# Patient Record
Sex: FEMALE | Race: WHITE
[De-identification: names, ages, dates, MRNs, and addresses within clinical notes are randomized per-mention and may not be internally consistent; named-entity substitution may affect disease eponyms.]

---

## 2017-03-03 ENCOUNTER — HOSPITAL ENCOUNTER (OUTPATIENT)
Age: 82
End: 2017-03-03
Payer: MEDICARE

## 2017-03-03 DIAGNOSIS — I34.0: Primary | ICD-10-CM

## 2023-06-15 ENCOUNTER — TRANSCRIBE ORDERS (OUTPATIENT)
Dept: ADMINISTRATIVE | Facility: HOSPITAL | Age: 88
End: 2023-06-15
Payer: MEDICARE

## 2023-06-15 DIAGNOSIS — R55 SYNCOPE AND COLLAPSE: Primary | ICD-10-CM

## 2023-07-27 ENCOUNTER — OFFICE VISIT (OUTPATIENT)
Dept: CARDIOLOGY | Facility: CLINIC | Age: 88
End: 2023-07-27
Payer: MEDICARE

## 2023-07-27 VITALS
WEIGHT: 130 LBS | SYSTOLIC BLOOD PRESSURE: 172 MMHG | HEART RATE: 57 BPM | BODY MASS INDEX: 21.66 KG/M2 | HEIGHT: 65 IN | DIASTOLIC BLOOD PRESSURE: 70 MMHG

## 2023-07-27 DIAGNOSIS — R55 SYNCOPE AND COLLAPSE: Primary | ICD-10-CM

## 2023-07-27 DIAGNOSIS — R01.1 HEART MURMUR: ICD-10-CM

## 2023-07-27 DIAGNOSIS — R00.1 BRADYCARDIA, SINUS: ICD-10-CM

## 2023-07-27 DIAGNOSIS — R09.89 BRUIT OF RIGHT CAROTID ARTERY: ICD-10-CM

## 2023-07-27 DIAGNOSIS — I25.6 SILENT MYOCARDIAL ISCHEMIA: ICD-10-CM

## 2023-07-27 DIAGNOSIS — I10 PRIMARY HYPERTENSION: ICD-10-CM

## 2023-07-27 PROCEDURE — 99204 OFFICE O/P NEW MOD 45 MIN: CPT | Performed by: INTERNAL MEDICINE

## 2023-07-27 PROCEDURE — 93000 ELECTROCARDIOGRAM COMPLETE: CPT | Performed by: INTERNAL MEDICINE

## 2023-07-27 PROCEDURE — 1159F MED LIST DOCD IN RCRD: CPT | Performed by: INTERNAL MEDICINE

## 2023-07-27 PROCEDURE — 1160F RVW MEDS BY RX/DR IN RCRD: CPT | Performed by: INTERNAL MEDICINE

## 2023-07-27 RX ORDER — CELECOXIB 200 MG/1
200 CAPSULE ORAL DAILY
COMMUNITY

## 2023-07-27 RX ORDER — ASPIRIN 81 MG/1
81 TABLET ORAL DAILY
Qty: 30 TABLET | Refills: 6 | Status: SHIPPED | OUTPATIENT
Start: 2023-07-27

## 2023-07-27 RX ORDER — VALSARTAN 320 MG/1
320 TABLET ORAL DAILY
COMMUNITY

## 2023-07-27 RX ORDER — METOPROLOL SUCCINATE 25 MG/1
25 TABLET, EXTENDED RELEASE ORAL DAILY
COMMUNITY
End: 2023-07-27

## 2023-07-27 RX ORDER — MINOXIDIL 10 MG/1
10 TABLET ORAL DAILY
Qty: 30 TABLET | Refills: 6 | Status: SHIPPED | OUTPATIENT
Start: 2023-07-27

## 2023-07-27 RX ORDER — FERROUS SULFATE 325(65) MG
325 TABLET ORAL
COMMUNITY

## 2023-07-27 RX ORDER — AMLODIPINE BESYLATE AND ATORVASTATIN CALCIUM 5; 20 MG/1; MG/1
1 TABLET, FILM COATED ORAL DAILY
COMMUNITY

## 2023-07-27 NOTE — PROGRESS NOTES
Chief Complaint   Patient presents with   • Establish Care     PCP referred, syncopal episode.    • Loss of Consciousness     2 episodes within the past 2 years, once at [facility] last year,stayed overnight at [facility], last episode at [facility] about 2 months ago, ER checked and discharged.    • Dizziness     Reports frequent episodes of dizziness. She does drink a lot of water.    • Cardiac history     Daughter said she wore monitor for [employer] a couple years ago after her first syncopal episode, LM requesting results. Getting hospital records to check for previous testing. PCP had scheduled pt for an echo but they had not been able to get ahold of the daughter.    • Labs     PCP checked a couple months ago, have requested results.    • Hypertension     Has not taken her medication this morning. Pt is taking it now.         CARDIAC COMPLAINTS  Dizziness and syncope      Subjective   Rita Camacho is a 88 y.o. female came in today with her daughter for her initial cardiac evaluation.  She has history of hypertension, history of memory problem who had 2 significant episodes of syncope in the last few years.  The first episode happened when she was at [facility].  According to the daughter, the patient was following in [facility] when she noticed that she is now at keeping up.  When she turned to look at she was slumped over the shopping cart and about to fall.  She was able to hold her and able to prevent falling onto the ground.  She apparently was unconscious for few seconds.  She was brought to the emergency room.  She was kept overnight had a echocardiogram done which was read by another cardiologist as having normal LV systolic function, mild left atrial enlargement and mild to moderate pulm hypertension.  The second episode happened with 2 months ago at the [facility].  It happened in the evening where she was talking to one of the members of the [facility] and the daughter went to the car to  something.  Someone called her  and asked her to call 911.  By the time she came back to [facility] patient was on the ground and was told that she passed out for few minutes.  She was brought to the emergency room but was not seen since she was just sitting there for few hours.  She went home.  Apparently she did injure her wrist and was seen by one of the orthopedic surgeon later on and was told nothing was broken.  She is now referred for further evaluation.  She continued to have episodes of dizziness.  She denies having any palpitation.  She denies having any chest pain.  She had labs done at your office recently but I do not have any of the results.  She has no history of smoking or drinking alcohol or using illicit drugs.  Her father  with lung disease and her mother  with a stroke    Past Surgical History:   Procedure Laterality Date   • ECHO - CONVERTED  2022    @ [facility]. - EF 55%. LA- 4.0. RVSP- 44 mmHg   • ECHO - CONVERTED  2018    @ [facility]. Dr. Kerr- EF 50%. RVSP- 49 mmHg.   • US CAROTID UNILATERAL  2021    < 50% Both ICA       Current Outpatient Medications   Medication Sig Dispense Refill   • amLODIPine-atorvastatin (CADUET) 5-20 MG per tablet Take 1 tablet by mouth Daily.     • Calcium Carbonate (CALCIUM 500 PO) Take  by mouth Daily.     • celecoxib (CeleBREX) 200 MG capsule Take 1 capsule by mouth Daily.     • ferrous sulfate 325 (65 FE) MG tablet Take 1 tablet by mouth Daily With Breakfast.     • PHOSPHATIDYLSERINE PO Take  by mouth 2 (Two) Times a Day.     • TURMERIC PO Take  by mouth Daily.     • valsartan (DIOVAN) 320 MG tablet Take 1 tablet by mouth Daily.     • vitamin D3 (Vitamin D) 125 MCG (5000 UT) capsule capsule Take 1 capsule by mouth Daily.     • aspirin 81 MG EC tablet Take 1 tablet by mouth Daily. 30 tablet 6   • minoxidil (LONITEN) 10 MG tablet Take 1 tablet by mouth Daily. 30 tablet 6     No current facility-administered medications for this visit.           ALLERGIES:  Patient has  "no known allergies.    Past Medical History:   Diagnosis Date   • Anemia    • Arthritis    • Dementia    • History of hysterectomy    • Hyperlipidemia    • Hypertension    • Stroke     daughter thinks pt may have had a stroke years ago       Social History     Tobacco Use   Smoking Status Never   Smokeless Tobacco Never          Family History   Problem Relation Age of Onset   • Stroke Mother    • Lung disease Father    • Other Maternal Grandmother         medical history unknown   • Other Maternal Grandfather         medical history unknown   • Other Paternal Grandmother         medical history unknown   • Other Paternal Grandfather         medical history unknown   • Other Other         6 siblings, medical history unknown   • Other Other         7 children, no known heart problems       Review of Systems   Constitutional: Negative for decreased appetite and malaise/fatigue.   HENT:  Negative for congestion and sore throat.    Eyes:  Negative for blurred vision, double vision and visual disturbance.   Cardiovascular:  Positive for near-syncope and syncope. Negative for chest pain and dyspnea on exertion.   Respiratory:  Negative for shortness of breath and snoring.    Endocrine: Negative for cold intolerance and heat intolerance.   Hematologic/Lymphatic: Negative for adenopathy. Does not bruise/bleed easily.   Skin:  Negative for itching, nail changes and skin cancer.   Musculoskeletal:  Negative for arthritis and myalgias.   Gastrointestinal:  Negative for abdominal pain, dysphagia and heartburn.   Genitourinary:  Negative for bladder incontinence and frequency.   Neurological:  Positive for dizziness. Negative for seizures and vertigo.   Psychiatric/Behavioral:  Negative for altered mental status.    Allergic/Immunologic: Negative for environmental allergies and hives.     Diabetes- No  Thyroid- normal    Objective     /70 (BP Location: Right arm)   Pulse 57   Ht 165.1 cm (65\")   Wt 59 kg (130 lb)   BMI " 21.63 kg/m²     Vitals and nursing note reviewed.   Constitutional:       Appearance: Healthy appearance. Not in distress.   Eyes:      Conjunctiva/sclera: Conjunctivae normal.      Pupils: Pupils are equal, round, and reactive to light.   HENT:      Head: Normocephalic.   Pulmonary:      Effort: Pulmonary effort is normal.      Breath sounds: Normal breath sounds.   Cardiovascular:      PMI at left midclavicular line. Bradycardia present. Regular rhythm. loud S2.       Murmurs: There is a grade 3/6 high frequency blowing holosystolic murmur at the apex.   Abdominal:      General: Bowel sounds are normal.      Palpations: Abdomen is soft.   Musculoskeletal: Normal range of motion.      Cervical back: Normal range of motion and neck supple. Skin:     General: Skin is warm and dry.   Neurological:      Mental Status: Alert, oriented to person, place, and time and oriented to person, place and time.       ECG 12 Lead    Date/Time: 7/27/2023 1:00 PM  Performed by: Nate Rashid MD  Authorized by: Nate Rashid MD   Comparison: compared with previous ECG from 12/18/2008  Comparison to previous ECG: Bradu=ycardia  Rhythm: sinus bradycardia  Rate: bradycardic  Conduction: incomplete left bundle branch block  QRS axis: normal  Other findings: non-specific ST-T wave changes    Clinical impression: abnormal EKG        @ASSESSMENT/PLAN@  BMI is within normal parameters. No other follow-up for BMI required.     Diagnoses and all orders for this visit:    1. Syncope and collapse (Primary)  -     Stress Test With Myocardial Perfusion One Day; Future  -     Adult Transthoracic Echo Complete W/ Cont if Necessary Per Protocol; Future  -     Mobile Cardiac Outpatient Telemetry; Future  -     US Carotid Bilateral; Future    2. Primary hypertension  -     minoxidil (LONITEN) 10 MG tablet; Take 1 tablet by mouth Daily.  Dispense: 30 tablet; Refill: 6    3. Heart murmur  -     Adult Transthoracic Echo Complete W/ Cont if  Necessary Per Protocol; Future    4. Bradycardia, sinus  -     Mobile Cardiac Outpatient Telemetry; Future    5. Silent myocardial ischemia  -     Stress Test With Myocardial Perfusion One Day; Future  -     aspirin 81 MG EC tablet; Take 1 tablet by mouth Daily.  Dispense: 30 tablet; Refill: 6    6. Bruit of right carotid artery  -     US Carotid Bilateral; Future  -     aspirin 81 MG EC tablet; Take 1 tablet by mouth Daily.  Dispense: 30 tablet; Refill: 6    At baseline she is mildly bradycardic.  Her blood pressure is moderately elevated.  Her EKG shows sinus bradycardia, incomplete left bundle branch block, nonspecific ST-T changes.  Her clinical examination reveals a BMI of 22.  She does have a carotid bruit on the right side.  She has slightly loud second heart sound.    Regarding the syncopal episode, she had 2 episodes both with sudden onset lasted only for few seconds to few minutes.  Nothing obvious was noted at that time.  I explained to her and the daughter that with bradycardia, she needs to rule out any significant pauses causing it.  She is not on any beta-blockers at this time.  I advised her to wear a monitor for the next 1 month.  I also explained to her about the possibility of silent myocardial ischemia.  I scheduled her to undergo a stress test in the form of Lexiscan to rule it out.    Regarding her hypertension, it is still moderately elevated.  She is already on a combination of calcium channel blockers, ARB and still the blood pressure is moderately elevated.  I added minoxidil 10 mg once a day.  Since I do not have her potassium level or renal function, I am afraid to add any Aldactone.  I advised her to have the blood pressure checked every day and call us if there is any significant drop in the blood pressure.    Regarding her heart murmur, need to rule out any valvular heart disease and also need to evaluate the PA pressure.  I scheduled her to undergo an echocardiogram for LVH, LV  function, valvular structures as well as to rule out pericardial effusion    Regarding the bradycardia, if there is any significant pauses during the monitor or if she develops AV block then she may be a candidate for a pacemaker placement    Regarding the carotid bruit, advised her to undergo carotid ultrasound.  Meanwhile I advised her to be on aspirin at 81 mg once a day    Regarding advanced directive, talked to her and the daughter about living will and power of .    Patient with results, further recommendations will be made.               Electronically signed by Nate Rashid MD July 27, 2023 12:44 EDT

## 2023-07-27 NOTE — LETTER
July 27, 2023       No Recipients    Patient: Rita Camacho   YOB: 1935   Date of Visit: 7/27/2023     Dear Nestor Hankins MD:       Thank you for referring Rita Camacho to me for evaluation. Below are the relevant portions of my assessment and plan of care.    If you have questions, please do not hesitate to call me. I look forward to following Rita along with you.         Sincerely,        Nate Rashid MD        CC:   No Recipients    Nate Rashid MD  07/27/23 1300  Sign when Signing Visit  Chief Complaint   Patient presents with   • Establish Care     PCP referred, syncopal episode.    • Loss of Consciousness     2 episodes within the past 2 years, once at [facility] last year,stayed overnight at [facility], last episode at [facility] about 2 months ago, ER checked and discharged.    • Dizziness     Reports frequent episodes of dizziness. She does drink a lot of water.    • Cardiac history     Daughter said she wore monitor for [employer] a couple years ago after her first syncopal episode, LM requesting results. Getting hospital records to check for previous testing. PCP had scheduled pt for an echo but they had not been able to get ahold of the daughter.    • Labs     PCP checked a couple months ago, have requested results.    • Hypertension     Has not taken her medication this morning. Pt is taking it now.         CARDIAC COMPLAINTS  Dizziness and syncope      Subjective  Rita Camacho is a 88 y.o. female came in today with her daughter for her initial cardiac evaluation.  She has history of hypertension, history of memory problem who had 2 significant episodes of syncope in the last few years.  The first episode happened when she was at [facility].  According to the daughter, the patient was following in [facility] when she noticed that she is now at keeping up.  When she turned to look at she was slumped over the shopping cart and about to fall.  She was able to hold her and able to  prevent falling onto the ground.  She apparently was unconscious for few seconds.  She was brought to the emergency room.  She was kept overnight had a echocardiogram done which was read by another cardiologist as having normal LV systolic function, mild left atrial enlargement and mild to moderate pulm hypertension.  The second episode happened with 2 months ago at the [facility].  It happened in the evening where she was talking to one of the members of the [facility] and the daughter went to the car to  something.  Someone called her and asked her to call 911.  By the time she came back to [facility] patient was on the ground and was told that she passed out for few minutes.  She was brought to the emergency room but was not seen since she was just sitting there for few hours.  She went home.  Apparently she did injure her wrist and was seen by one of the orthopedic surgeon later on and was told nothing was broken.  She is now referred for further evaluation.  She continued to have episodes of dizziness.  She denies having any palpitation.  She denies having any chest pain.  She had labs done at your office recently but I do not have any of the results.  She has no history of smoking or drinking alcohol or using illicit drugs.  Her father  with lung disease and her mother  with a stroke    Past Surgical History:   Procedure Laterality Date   • ECHO - CONVERTED  2022    @ [facility]. - EF 55%. LA- 4.0. RVSP- 44 mmHg   • ECHO - CONVERTED  2018    @ [facility]. Dr. Kerr- EF 50%. RVSP- 49 mmHg.   • US CAROTID UNILATERAL  2021    < 50% Both ICA       Current Outpatient Medications   Medication Sig Dispense Refill   • amLODIPine-atorvastatin (CADUET) 5-20 MG per tablet Take 1 tablet by mouth Daily.     • Calcium Carbonate (CALCIUM 500 PO) Take  by mouth Daily.     • celecoxib (CeleBREX) 200 MG capsule Take 1 capsule by mouth Daily.     • ferrous sulfate 325 (65 FE) MG tablet Take 1 tablet by  mouth Daily With Breakfast.     • PHOSPHATIDYLSERINE PO Take  by mouth 2 (Two) Times a Day.     • TURMERIC PO Take  by mouth Daily.     • valsartan (DIOVAN) 320 MG tablet Take 1 tablet by mouth Daily.     • vitamin D3 (Vitamin D) 125 MCG (5000 UT) capsule capsule Take 1 capsule by mouth Daily.     • aspirin 81 MG EC tablet Take 1 tablet by mouth Daily. 30 tablet 6   • minoxidil (LONITEN) 10 MG tablet Take 1 tablet by mouth Daily. 30 tablet 6     No current facility-administered medications for this visit.           ALLERGIES:  Patient has no known allergies.    Past Medical History:   Diagnosis Date   • Anemia    • Arthritis    • Dementia    • History of hysterectomy    • Hyperlipidemia    • Hypertension    • Stroke     daughter thinks pt may have had a stroke years ago       Social History     Tobacco Use   Smoking Status Never   Smokeless Tobacco Never          Family History   Problem Relation Age of Onset   • Stroke Mother    • Lung disease Father    • Other Maternal Grandmother         medical history unknown   • Other Maternal Grandfather         medical history unknown   • Other Paternal Grandmother         medical history unknown   • Other Paternal Grandfather         medical history unknown   • Other Other         6 siblings, medical history unknown   • Other Other         7 children, no known heart problems       Review of Systems   Constitutional: Negative for decreased appetite and malaise/fatigue.   HENT:  Negative for congestion and sore throat.    Eyes:  Negative for blurred vision, double vision and visual disturbance.   Cardiovascular:  Positive for near-syncope and syncope. Negative for chest pain and dyspnea on exertion.   Respiratory:  Negative for shortness of breath and snoring.    Endocrine: Negative for cold intolerance and heat intolerance.   Hematologic/Lymphatic: Negative for adenopathy. Does not bruise/bleed easily.   Skin:  Negative for itching, nail changes and skin cancer.  "  Musculoskeletal:  Negative for arthritis and myalgias.   Gastrointestinal:  Negative for abdominal pain, dysphagia and heartburn.   Genitourinary:  Negative for bladder incontinence and frequency.   Neurological:  Positive for dizziness. Negative for seizures and vertigo.   Psychiatric/Behavioral:  Negative for altered mental status.    Allergic/Immunologic: Negative for environmental allergies and hives.     Diabetes- No  Thyroid- normal    Objective    /70 (BP Location: Right arm)   Pulse 57   Ht 165.1 cm (65\")   Wt 59 kg (130 lb)   BMI 21.63 kg/m²     Vitals and nursing note reviewed.   Constitutional:       Appearance: Healthy appearance. Not in distress.   Eyes:      Conjunctiva/sclera: Conjunctivae normal.      Pupils: Pupils are equal, round, and reactive to light.   HENT:      Head: Normocephalic.   Pulmonary:      Effort: Pulmonary effort is normal.      Breath sounds: Normal breath sounds.   Cardiovascular:      PMI at left midclavicular line. Bradycardia present. Regular rhythm. loud S2.       Murmurs: There is a grade 3/6 high frequency blowing holosystolic murmur at the apex.   Abdominal:      General: Bowel sounds are normal.      Palpations: Abdomen is soft.   Musculoskeletal: Normal range of motion.      Cervical back: Normal range of motion and neck supple. Skin:     General: Skin is warm and dry.   Neurological:      Mental Status: Alert, oriented to person, place, and time and oriented to person, place and time.       ECG 12 Lead    Date/Time: 7/27/2023 1:00 PM  Performed by: Nate Rashid MD  Authorized by: Nate Rashid MD   Comparison: compared with previous ECG from 12/18/2008  Comparison to previous ECG: Bradu=ycardia  Rhythm: sinus bradycardia  Rate: bradycardic  Conduction: incomplete left bundle branch block  QRS axis: normal  Other findings: non-specific ST-T wave changes    Clinical impression: abnormal EKG        @ASSESSMENT/PLAN@  BMI is within normal " parameters. No other follow-up for BMI required.     Diagnoses and all orders for this visit:    1. Syncope and collapse (Primary)  -     Stress Test With Myocardial Perfusion One Day; Future  -     Adult Transthoracic Echo Complete W/ Cont if Necessary Per Protocol; Future  -     Mobile Cardiac Outpatient Telemetry; Future  -     US Carotid Bilateral; Future    2. Primary hypertension  -     minoxidil (LONITEN) 10 MG tablet; Take 1 tablet by mouth Daily.  Dispense: 30 tablet; Refill: 6    3. Heart murmur  -     Adult Transthoracic Echo Complete W/ Cont if Necessary Per Protocol; Future    4. Bradycardia, sinus  -     Mobile Cardiac Outpatient Telemetry; Future    5. Silent myocardial ischemia  -     Stress Test With Myocardial Perfusion One Day; Future  -     aspirin 81 MG EC tablet; Take 1 tablet by mouth Daily.  Dispense: 30 tablet; Refill: 6    6. Bruit of right carotid artery  -     US Carotid Bilateral; Future  -     aspirin 81 MG EC tablet; Take 1 tablet by mouth Daily.  Dispense: 30 tablet; Refill: 6    At baseline she is mildly bradycardic.  Her blood pressure is moderately elevated.  Her EKG shows sinus bradycardia, incomplete left bundle branch block, nonspecific ST-T changes.  Her clinical examination reveals a BMI of 22.  She does have a carotid bruit on the right side.  She has slightly loud second heart sound.    Regarding the syncopal episode, she had 2 episodes both with sudden onset lasted only for few seconds to few minutes.  Nothing obvious was noted at that time.  I explained to her and the daughter that with bradycardia, she needs to rule out any significant pauses causing it.  She is not on any beta-blockers at this time.  I advised her to wear a monitor for the next 1 month.  I also explained to her about the possibility of silent myocardial ischemia.  I scheduled her to undergo a stress test in the form of Lexiscan to rule it out.    Regarding her hypertension, it is still moderately  elevated.  She is already on a combination of calcium channel blockers, ARB and still the blood pressure is moderately elevated.  I added minoxidil 10 mg once a day.  Since I do not have her potassium level or renal function, I am afraid to add any Aldactone.  I advised her to have the blood pressure checked every day and call us if there is any significant drop in the blood pressure.    Regarding her heart murmur, need to rule out any valvular heart disease and also need to evaluate the PA pressure.  I scheduled her to undergo an echocardiogram for LVH, LV function, valvular structures as well as to rule out pericardial effusion    Regarding the bradycardia, if there is any significant pauses during the monitor or if she develops AV block then she may be a candidate for a pacemaker placement    Regarding the carotid bruit, advised her to undergo carotid ultrasound.  Meanwhile I advised her to be on aspirin at 81 mg once a day    Regarding advanced directive, talked to her and the daughter about living will and power of .    Patient with results, further recommendations will be made.               Electronically signed by Nate Rashid MD July 27, 2023 12:44 EDT

## 2023-08-01 ENCOUNTER — CLINICAL SUPPORT (OUTPATIENT)
Dept: CARDIOLOGY | Facility: CLINIC | Age: 88
End: 2023-08-01
Payer: MEDICARE

## 2023-08-01 ENCOUNTER — TELEPHONE (OUTPATIENT)
Dept: CARDIOLOGY | Facility: CLINIC | Age: 88
End: 2023-08-01

## 2023-08-01 VITALS
HEIGHT: 65 IN | DIASTOLIC BLOOD PRESSURE: 60 MMHG | WEIGHT: 132 LBS | SYSTOLIC BLOOD PRESSURE: 130 MMHG | BODY MASS INDEX: 21.99 KG/M2 | HEART RATE: 76 BPM

## 2023-08-01 DIAGNOSIS — Z01.30 BLOOD PRESSURE CHECK: Primary | ICD-10-CM

## 2023-08-02 ENCOUNTER — TELEPHONE (OUTPATIENT)
Dept: CARDIOLOGY | Facility: CLINIC | Age: 88
End: 2023-08-02
Payer: MEDICARE

## 2023-08-02 NOTE — TELEPHONE ENCOUNTER
Patient's daughter, Aracely, came by this morning stating that they checked patient's BP at [facility] and BP was 171/90.   Called back to discuss, was unable to reach or leave voicemail.

## 2023-08-07 ENCOUNTER — TELEPHONE (OUTPATIENT)
Dept: CARDIOLOGY | Facility: CLINIC | Age: 88
End: 2023-08-07
Payer: MEDICARE

## 2023-08-07 ENCOUNTER — CLINICAL SUPPORT (OUTPATIENT)
Dept: CARDIOLOGY | Facility: CLINIC | Age: 88
End: 2023-08-07
Payer: MEDICARE

## 2023-08-08 NOTE — TELEPHONE ENCOUNTER
Patient's daughter, Aracely, was made aware for patient to continue medications the same for now. She was made aware that we would call with results after patient has stress and echo.

## 2023-08-11 ENCOUNTER — TELEPHONE (OUTPATIENT)
Dept: CARDIOLOGY | Facility: CLINIC | Age: 88
End: 2023-08-11
Payer: MEDICARE

## 2023-08-11 NOTE — TELEPHONE ENCOUNTER
Received call from backstitch on urgent report for patient of a new onset of afib with rate of 106. Report is on April's desk for you to review.

## 2023-08-11 NOTE — TELEPHONE ENCOUNTER
Patient's daughter, Aracely, aware of recommendations to add Xarelto 20 mg daily.  Script sent to [facility] with 30 day free card information.  BIN [id number]  ID [id number]  [facility] [id number]

## 2023-08-15 ENCOUNTER — HOSPITAL ENCOUNTER (OUTPATIENT)
Dept: CARDIOLOGY | Facility: HOSPITAL | Age: 88
Discharge: HOME | End: 2023-08-15
Payer: MEDICARE

## 2023-08-15 ENCOUNTER — HOSPITAL ENCOUNTER (OUTPATIENT)
Dept: CARDIOLOGY | Facility: HOSPITAL | Age: 88
End: 2023-08-15
Payer: MEDICARE

## 2023-08-15 VITALS — WEIGHT: 132.06 LBS | HEIGHT: 65 IN | BODY MASS INDEX: 22 KG/M2

## 2023-08-15 DIAGNOSIS — R55 SYNCOPE AND COLLAPSE: ICD-10-CM

## 2023-08-15 DIAGNOSIS — R09.89 BRUIT OF RIGHT CAROTID ARTERY: ICD-10-CM

## 2023-08-15 DIAGNOSIS — I25.6 SILENT MYOCARDIAL ISCHEMIA: ICD-10-CM

## 2023-08-15 DIAGNOSIS — R01.1 HEART MURMUR: ICD-10-CM

## 2023-08-15 LAB
AORTIC DIMENSIONLESS INDEX: 0.63 (DI)
BH CV ECHO MEAS - ACS: 1.5 CM
BH CV ECHO MEAS - AO MAX PG: 12.7 MMHG
BH CV ECHO MEAS - AO MEAN PG: 6.9 MMHG
BH CV ECHO MEAS - AO ROOT DIAM: 3.4 CM
BH CV ECHO MEAS - AO V2 MAX: 178.2 CM/SEC
BH CV ECHO MEAS - AO V2 VTI: 44.4 CM
BH CV ECHO MEAS - AVA(I,D): 1.93 CM2
BH CV ECHO MEAS - EDV(CUBED): 101.2 ML
BH CV ECHO MEAS - EF(MOD-BP): 58 %
BH CV ECHO MEAS - EF_3D-VOL: 51 %
BH CV ECHO MEAS - ESV(CUBED): 32.9 ML
BH CV ECHO MEAS - FS: 31.3 %
BH CV ECHO MEAS - IVS/LVPW: 1.15 CM
BH CV ECHO MEAS - IVSD: 1.09 CM
BH CV ECHO MEAS - LA A2CS (ATRIAL LENGTH): 5.7 CM
BH CV ECHO MEAS - LA A4C LENGTH: 6 CM
BH CV ECHO MEAS - LA DIMENSION: 4 CM
BH CV ECHO MEAS - LAT PEAK E' VEL: 5.4 CM/SEC
BH CV ECHO MEAS - LV MASS(C)D: 166.6 GRAMS
BH CV ECHO MEAS - LV MAX PG: 5.1 MMHG
BH CV ECHO MEAS - LV MEAN PG: 2.7 MMHG
BH CV ECHO MEAS - LV V1 MAX: 112.8 CM/SEC
BH CV ECHO MEAS - LV V1 VTI: 26.4 CM
BH CV ECHO MEAS - LVIDD: 4.7 CM
BH CV ECHO MEAS - LVIDS: 3.2 CM
BH CV ECHO MEAS - LVOT AREA: 3.2 CM2
BH CV ECHO MEAS - LVOT DIAM: 2.03 CM
BH CV ECHO MEAS - LVPWD: 0.95 CM
BH CV ECHO MEAS - MED PEAK E' VEL: 4.9 CM/SEC
BH CV ECHO MEAS - MV A MAX VEL: 90.6 CM/SEC
BH CV ECHO MEAS - MV DEC SLOPE: 343.7 CM/SEC2
BH CV ECHO MEAS - MV DEC TIME: 0.23 MSEC
BH CV ECHO MEAS - MV E MAX VEL: 72.6 CM/SEC
BH CV ECHO MEAS - MV E/A: 0.8
BH CV ECHO MEAS - MV MAX PG: 3.6 MMHG
BH CV ECHO MEAS - MV MEAN PG: 1.83 MMHG
BH CV ECHO MEAS - MV P1/2T: 73.2 MSEC
BH CV ECHO MEAS - MV V2 VTI: 29.2 CM
BH CV ECHO MEAS - MVA(P1/2T): 3 CM2
BH CV ECHO MEAS - MVA(VTI): 2.9 CM2
BH CV ECHO MEAS - PA V2 MAX: 101 CM/SEC
BH CV ECHO MEAS - RAP SYSTOLE: 8 MMHG
BH CV ECHO MEAS - RV MAX PG: 1.85 MMHG
BH CV ECHO MEAS - RV V1 MAX: 68 CM/SEC
BH CV ECHO MEAS - RV V1 VTI: 14 CM
BH CV ECHO MEAS - RVSP: 54 MMHG
BH CV ECHO MEAS - SV(LVOT): 85.7 ML
BH CV ECHO MEAS - TAPSE (>1.6): 2.48 CM
BH CV ECHO MEAS - TR MAX PG: 46 MMHG
BH CV ECHO MEAS - TR MAX VEL: 339.2 CM/SEC
BH CV ECHO MEASUREMENTS AVERAGE E/E' RATIO: 14.1
BH CV XLRA - TDI S': 18.3 CM/SEC
LEFT ATRIUM VOLUME INDEX: 52.8 ML/M2
LEFT ATRIUM VOLUME: 88 ML
SINUS: 3.2 CM

## 2023-08-15 PROCEDURE — 93306 TTE W/DOPPLER COMPLETE: CPT | Performed by: INTERNAL MEDICINE

## 2023-08-15 PROCEDURE — 93880 EXTRACRANIAL BILAT STUDY: CPT

## 2023-08-15 PROCEDURE — 93306 TTE W/DOPPLER COMPLETE: CPT

## 2023-08-17 ENCOUNTER — HOSPITAL ENCOUNTER (OUTPATIENT)
Dept: CARDIOLOGY | Facility: HOSPITAL | Age: 88
Discharge: HOME | End: 2023-08-17
Payer: MEDICARE

## 2023-08-17 DIAGNOSIS — R09.89 BRUIT OF RIGHT CAROTID ARTERY: Primary | ICD-10-CM

## 2023-08-17 DIAGNOSIS — I65.29 STENOSIS OF CAROTID ARTERY, UNSPECIFIED LATERALITY: ICD-10-CM

## 2023-08-17 LAB
BH CV REST NUCLEAR ISOTOPE DOSE: 10 MCI
BH CV STRESS COMMENTS STAGE 1: NORMAL
BH CV STRESS DOSE REGADENOSON STAGE 1: 0.4
BH CV STRESS DURATION MIN STAGE 1: 0
BH CV STRESS DURATION SEC STAGE 1: 10
BH CV STRESS NUCLEAR ISOTOPE DOSE: 30 MCI
BH CV STRESS PROTOCOL 1: NORMAL
BH CV STRESS RECOVERY BP: NORMAL MMHG
BH CV STRESS RECOVERY HR: 87 BPM
BH CV STRESS STAGE 1: 1
LV EF NUC BP: 64 %
MAXIMAL PREDICTED HEART RATE: 133 BPM
PERCENT MAX PREDICTED HR: 65.41 %
STRESS BASELINE BP: NORMAL MMHG
STRESS BASELINE HR: 76 BPM
STRESS PERCENT HR: 77 %
STRESS POST PEAK BP: NORMAL MMHG
STRESS POST PEAK HR: 87 BPM
STRESS TARGET HR: 113 BPM

## 2023-08-17 PROCEDURE — A9500 TC99M SESTAMIBI: HCPCS | Performed by: INTERNAL MEDICINE

## 2023-08-17 PROCEDURE — 93017 CV STRESS TEST TRACING ONLY: CPT

## 2023-08-17 PROCEDURE — 0 TECHNETIUM SESTAMIBI: Performed by: INTERNAL MEDICINE

## 2023-08-17 PROCEDURE — 78452 HT MUSCLE IMAGE SPECT MULT: CPT

## 2023-08-17 PROCEDURE — 25010000002 REGADENOSON 0.4 MG/5ML SOLUTION: Performed by: INTERNAL MEDICINE

## 2023-08-17 RX ORDER — REGADENOSON 0.08 MG/ML
0.4 INJECTION, SOLUTION INTRAVENOUS
Status: COMPLETED | OUTPATIENT
Start: 2023-08-17 | End: 2023-08-17

## 2023-08-17 RX ADMIN — TECHNETIUM TC 99M SESTAMIBI 1 DOSE: 1 INJECTION INTRAVENOUS at 10:32

## 2023-08-17 RX ADMIN — REGADENOSON 0.4 MG: 0.08 INJECTION, SOLUTION INTRAVENOUS at 10:32

## 2023-08-17 RX ADMIN — TECHNETIUM TC 99M SESTAMIBI 1 DOSE: 1 INJECTION INTRAVENOUS at 09:30

## 2023-08-18 RX ORDER — FUROSEMIDE 20 MG/1
20 TABLET ORAL DAILY
Qty: 90 TABLET | Refills: 1 | Status: SHIPPED | OUTPATIENT
Start: 2023-08-18

## 2023-08-25 ENCOUNTER — TELEPHONE (OUTPATIENT)
Dept: CARDIOLOGY | Facility: CLINIC | Age: 88
End: 2023-08-25
Payer: MEDICARE

## 2023-08-25 NOTE — TELEPHONE ENCOUNTER
Emailed rep for [employer] that patient has ran out of patches for MCOT and is needing more shipped to her.     Patient and daughter came by office this week to make sure that patch was on correctly at the phone that comes with the monitor was saying it was not connected correctly. Helped them with the patch. Patient's daughter asked for BP to be taken. BP was 130/60 (left arm) and HR 76.

## 2023-08-28 ENCOUNTER — TELEPHONE (OUTPATIENT)
Dept: CARDIOLOGY | Facility: CLINIC | Age: 88
End: 2023-08-28
Payer: MEDICARE

## 2023-08-28 NOTE — TELEPHONE ENCOUNTER
Patient's Daughter came by today with a few concerns with her mother.  Firstly, she is concerned that her mother's legs are still swelling even after taking her water pill that we have prescribed.      Also, she feels like the patient is in need of medication for anxiety/dementia.  I told patient that might be something that she discusses with her Primary Care (Dr. Hankins- Daughter states she will also look and see when her mother is supposed to see him).      Finally, patient came in the office last week with issues with the Holter Monitor that was received in the mail and we helped her get it hooked up.  Patient's Daughter stated that she was told by our office that she would be receiving more pates to keep the leads in place but is worried because she has not got these yet.    Please contact Aracely (Daughter) to get these issues resolved. Thank you in advance.

## 2023-09-05 NOTE — TELEPHONE ENCOUNTER
Patient's daughter, Aracely, states that patient still has swelling in legs and feet. She states that PCP told them that the swelling probably wouldn't ever go away. Patient takes lasix 20 mg daily.     She states that PCP also prescribed mirtazapine today.

## 2023-09-07 NOTE — TELEPHONE ENCOUNTER
Spoke with pt's daughter, she is going to get her mother a pair of above the knee support stockings to try.

## 2023-09-20 ENCOUNTER — TELEPHONE (OUTPATIENT)
Dept: CARDIOLOGY | Facility: CLINIC | Age: 88
End: 2023-09-20
Payer: MEDICARE

## 2023-09-20 NOTE — TELEPHONE ENCOUNTER
Caller: ELIER SUGGS    Relationship: Emergency Contact    Best call back number:[phone]    Caller requesting test results: PT'S EC (DAUGHTER)    What test was performed: PT HAD A RECENT PROCEDURE DONE AND TESTING.     When was the test performed: LAST FRIDAY    Where was the test performed: [facility] IN [place]

## 2023-11-07 ENCOUNTER — OFFICE VISIT (OUTPATIENT)
Dept: CARDIOLOGY | Facility: CLINIC | Age: 88
End: 2023-11-07
Payer: MEDICARE

## 2023-11-07 ENCOUNTER — TELEPHONE (OUTPATIENT)
Dept: CARDIOLOGY | Facility: CLINIC | Age: 88
End: 2023-11-07

## 2023-11-07 VITALS
HEIGHT: 65 IN | SYSTOLIC BLOOD PRESSURE: 120 MMHG | WEIGHT: 129.6 LBS | DIASTOLIC BLOOD PRESSURE: 58 MMHG | BODY MASS INDEX: 21.59 KG/M2 | HEART RATE: 68 BPM

## 2023-11-07 DIAGNOSIS — I10 PRIMARY HYPERTENSION: ICD-10-CM

## 2023-11-07 DIAGNOSIS — R01.1 HEART MURMUR: ICD-10-CM

## 2023-11-07 DIAGNOSIS — R55 SYNCOPE AND COLLAPSE: Primary | ICD-10-CM

## 2023-11-07 DIAGNOSIS — E78.00 HYPERCHOLESTEROLEMIA: ICD-10-CM

## 2023-11-07 DIAGNOSIS — R60.0 BILATERAL LEG EDEMA: ICD-10-CM

## 2023-11-07 PROCEDURE — 99214 OFFICE O/P EST MOD 30 MIN: CPT | Performed by: NURSE PRACTITIONER

## 2023-11-07 PROCEDURE — 1159F MED LIST DOCD IN RCRD: CPT | Performed by: NURSE PRACTITIONER

## 2023-11-07 PROCEDURE — 1160F RVW MEDS BY RX/DR IN RCRD: CPT | Performed by: NURSE PRACTITIONER

## 2023-11-07 NOTE — PROGRESS NOTES
Chief Complaint   Patient presents with    Follow-up     Cardiac management    Lab     Last labs yesterday per PCP. Had CTA carotid, see chart.    Edema     Having some swelling in ankles, she will wear compression hose.    Med Refill     Requesting 90 day refill on xarelto.     Subjective       Rita Camacho is a 88 y.o. female referred in July 2023 secondary to 2 syncopal episodes.  Both episodes occurred when she was awake, alert walking or talking.  She would slump over and be unconscious for a few seconds to minutes.  Work-up with stress, echo, Holter, carotid showed no cardiac ischemia, normal LV function, no significant valvular pathology, no carotid stenosis, no heart block or pauses.  Holter did show few runs of atrial fibrillation for which Xarelto was started.  She had elevated blood pressure at consult, minoxidil added to amlodipine and valsartan.    Labs 6/15/2023 showed BUN 33, creatinine 1.1, GFR 49, glucose 127, normal electrolytes, H/H 10.7/31.7, platelets 223, mag 2.0.  On 9/15/2023, GFR improved to 57.    She presents today for follow-up with her daughter who is her caregiver. She denies recurrent syncope. BP remains well controlled, checked frequently at home. She denies CP, SOB, palpitations. She tolerates Xarelto without bleeding. Mild LE edema improved with compression.          Cardiac History:    Past Surgical History:   Procedure Laterality Date    CARDIOVASCULAR STRESS TEST  08/17/2023    Lexiscan- EF 64%. 168/57. Negative    ECHO - CONVERTED  01/04/2022    @ [facility]. - EF 55%. LA- 4.0. RVSP- 44 mmHg    ECHO - CONVERTED  08/08/2018    @ [facility]. Dr. Kerr- EF 50%. RVSP- 49 mmHg.    ECHO - CONVERTED  08/15/2023    EF 60%. LA- 4.0. Mild MR & AI. RVSP- 54 mmHg    OTHER SURGICAL HISTORY  09/12/2023    MCOT. 25 Days. Avg 76. . < 1% A.Fib. 3 SVT. 3% PVC. 1% PAC    OTHER SURGICAL HISTORY  09/15/2023    CTA Carotid- No sig lesion    US CAROTID UNILATERAL  12/14/2021    < 50% Both  ICA    US CAROTID UNILATERAL  08/15/2023    Modertae disease (L) ICA     Current Outpatient Medications   Medication Sig Dispense Refill    amLODIPine-atorvastatin (CADUET) 5-20 MG per tablet Take 1 tablet by mouth Daily.      aspirin 81 MG EC tablet Take 1 tablet by mouth Daily. 30 tablet 6    Calcium Carbonate (CALCIUM 500 PO) Take  by mouth Daily.      ferrous sulfate 325 (65 FE) MG tablet Take 1 tablet by mouth Daily With Breakfast.      furosemide (LASIX) 20 MG tablet Take 1 tablet by mouth Daily. 90 tablet 1    minoxidil (LONITEN) 10 MG tablet Take 1 tablet by mouth Daily. 30 tablet 6    rivaroxaban (XARELTO) 20 MG tablet Take 1 tablet by mouth Daily. 90 tablet 3    valsartan (DIOVAN) 320 MG tablet Take 1 tablet by mouth Daily.      vitamin D3 (Vitamin D) 125 MCG (5000 UT) capsule capsule Take 1 capsule by mouth Daily.       No current facility-administered medications for this visit.     Patient has no known allergies.    Past Medical History:   Diagnosis Date    Anemia     Arthritis     Dementia     History of hysterectomy     Hyperlipidemia     Hypertension     Stroke     daughter thinks pt may have had a stroke years ago     Social History     Socioeconomic History    Marital status:    Tobacco Use    Smoking status: Never     Passive exposure: Past    Smokeless tobacco: Never   Vaping Use    Vaping Use: Never used   Substance and Sexual Activity    Alcohol use: Never    Drug use: Never    Sexual activity: Defer     Family History   Problem Relation Age of Onset    Stroke Mother     Lung disease Father     Other Maternal Grandmother         medical history unknown    Other Maternal Grandfather         medical history unknown    Other Paternal Grandmother         medical history unknown    Other Paternal Grandfather         medical history unknown    Other Other         6 siblings, medical history unknown    Other Other         7 children, no known heart problems     Review of Systems  "  Constitutional: Positive for malaise/fatigue and weight loss (-3). Negative for decreased appetite.   HENT: Negative.     Eyes:  Negative for blurred vision.   Cardiovascular:  Positive for dyspnea on exertion and leg swelling. Negative for chest pain, palpitations and syncope.   Respiratory:  Negative for shortness of breath and sleep disturbances due to breathing.    Endocrine: Negative.    Hematologic/Lymphatic: Negative for bleeding problem. Does not bruise/bleed easily.   Skin: Negative.    Musculoskeletal:  Negative for falls and myalgias.   Gastrointestinal:  Negative for abdominal pain, heartburn and melena.   Genitourinary:  Negative for hematuria.   Neurological:  Negative for dizziness and light-headedness.   Psychiatric/Behavioral:  Positive for memory loss. Negative for altered mental status.    Allergic/Immunologic: Negative.       Objective     /58 (BP Location: Left arm)   Pulse 68   Ht 165 cm (64.96\")   Wt 58.8 kg (129 lb 9.6 oz)   BMI 21.59 kg/m²     Vitals and nursing note reviewed.   Constitutional:       General: Not in acute distress.     Appearance: Well-developed. Not diaphoretic.   Eyes:      Pupils: Pupils are equal, round, and reactive to light.   HENT:      Head: Normocephalic.   Pulmonary:      Effort: Pulmonary effort is normal. No respiratory distress.      Breath sounds: Normal breath sounds.   Cardiovascular:      Normal rate. Regular rhythm.      Murmurs: There is a grade 1/6 systolic murmur.   Pulses:     Intact distal pulses.   Edema:     Peripheral edema present.     Ankle: bilateral trace edema of the ankle.  Abdominal:      General: Bowel sounds are normal.      Palpations: Abdomen is soft.   Musculoskeletal: Normal range of motion.      Cervical back: Normal range of motion. Skin:     General: Skin is warm and dry.   Neurological:      Mental Status: Alert and oriented to person, place, and time.        Procedures          Problem List Items Addressed This Visit  "         Cardiac and Vasculature    Heart murmur    Primary hypertension    Hypercholesterolemia       Symptoms and Signs    Syncope and collapse - Primary    Bilateral leg edema      HTN  -Well-controlled at 120/58  -Continue valsartan, amlodipine, minoxidil and Lasix  -Limit sodium intake    Hypercholesterolemia  -Managed with atorvastatin  -Lipids followed by PCP    PAF  -Less than 1% AF noted on Holter monitor  -She appears to be in rhythm today  -Continue Xarelto for CVA prophylaxis, she is not on antiarrhythmic or rate lowering drugs    Syncope  -No recurrent episodes  -No significant arrhythmia or carotid stenosis identified  -Encouraged adequate fluid intake to prevent hypovolemia, orthostatic hypotension  -Encouraged regular p.o. intake to maintain normal blood sugar    Bilateral LE edema  -Continue Lasix 20 mg, compression and elevation    No changes made today.  Follow-up 6 months or sooner if needed.    BMI is within normal parameters. No other follow-up for BMI required.               Electronically signed by TIANA Madrid,  November 9, 2023 11:58 EST

## 2023-11-07 NOTE — LETTER
November 9, 2023       No Recipients    Patient: Rita Camacho   YOB: 1935   Date of Visit: 11/7/2023       Dear Nestor Hankins MD    Rita Camacho was in my office today. Below is a copy of my note.    If you have questions, please do not hesitate to call me. I look forward to following Rita along with you.         Sincerely,        TIANA Madrid        CC:   No Recipients    Chief Complaint   Patient presents with   • Follow-up     Cardiac management   • Lab     Last labs yesterday per PCP. Had CTA carotid, see chart.   • Edema     Having some swelling in ankles, she will wear compression hose.   • Med Refill     Requesting 90 day refill on xarelto.     Subjective      Rita Camacho is a 88 y.o. female referred in July 2023 secondary to 2 syncopal episodes.  Both episodes occurred when she was awake, alert walking or talking.  She would slump over and be unconscious for a few seconds to minutes.  Work-up with stress, echo, Holter, carotid showed no cardiac ischemia, normal LV function, no significant valvular pathology, no carotid stenosis, no heart block or pauses.  Holter did show few runs of atrial fibrillation for which Xarelto was started.  She had elevated blood pressure at consult, minoxidil added to amlodipine and valsartan.    Labs 6/15/2023 showed BUN 33, creatinine 1.1, GFR 49, glucose 127, normal electrolytes, H/H 10.7/31.7, platelets 223, mag 2.0.  On 9/15/2023, GFR improved to 57.    She presents today for follow-up with her daughter who is her caregiver. She denies recurrent syncope. BP remains well controlled, checked frequently at home. She denies CP, SOB, palpitations. She tolerates Xarelto without bleeding. Mild LE edema improved with compression.          Cardiac History:    Past Surgical History:   Procedure Laterality Date   • CARDIOVASCULAR STRESS TEST  08/17/2023    Lexiscan- EF 64%. 168/57. Negative   • ECHO - CONVERTED  01/04/2022    @ [facility]. - EF  55%. LA- 4.0. RVSP- 44 mmHg   • ECHO - CONVERTED  08/08/2018    @ [facility]. Dr. Kerr- EF 50%. RVSP- 49 mmHg.   • ECHO - CONVERTED  08/15/2023    EF 60%. LA- 4.0. Mild MR & AI. RVSP- 54 mmHg   • OTHER SURGICAL HISTORY  09/12/2023    MCOT. 25 Days. Avg 76. . < 1% A.Fib. 3 SVT. 3% PVC. 1% PAC   • OTHER SURGICAL HISTORY  09/15/2023    CTA Carotid- No sig lesion   • US CAROTID UNILATERAL  12/14/2021    < 50% Both ICA   • US CAROTID UNILATERAL  08/15/2023    Modertae disease (L) ICA     Current Outpatient Medications   Medication Sig Dispense Refill   • amLODIPine-atorvastatin (CADUET) 5-20 MG per tablet Take 1 tablet by mouth Daily.     • aspirin 81 MG EC tablet Take 1 tablet by mouth Daily. 30 tablet 6   • Calcium Carbonate (CALCIUM 500 PO) Take  by mouth Daily.     • ferrous sulfate 325 (65 FE) MG tablet Take 1 tablet by mouth Daily With Breakfast.     • furosemide (LASIX) 20 MG tablet Take 1 tablet by mouth Daily. 90 tablet 1   • minoxidil (LONITEN) 10 MG tablet Take 1 tablet by mouth Daily. 30 tablet 6   • rivaroxaban (XARELTO) 20 MG tablet Take 1 tablet by mouth Daily. 90 tablet 3   • valsartan (DIOVAN) 320 MG tablet Take 1 tablet by mouth Daily.     • vitamin D3 (Vitamin D) 125 MCG (5000 UT) capsule capsule Take 1 capsule by mouth Daily.       No current facility-administered medications for this visit.     Patient has no known allergies.    Past Medical History:   Diagnosis Date   • Anemia    • Arthritis    • Dementia    • History of hysterectomy    • Hyperlipidemia    • Hypertension    • Stroke     daughter thinks pt may have had a stroke years ago     Social History     Socioeconomic History   • Marital status:    Tobacco Use   • Smoking status: Never     Passive exposure: Past   • Smokeless tobacco: Never   Vaping Use   • Vaping Use: Never used   Substance and Sexual Activity   • Alcohol use: Never   • Drug use: Never   • Sexual activity: Defer     Family History   Problem Relation Age of Onset   •  "Stroke Mother    • Lung disease Father    • Other Maternal Grandmother         medical history unknown   • Other Maternal Grandfather         medical history unknown   • Other Paternal Grandmother         medical history unknown   • Other Paternal Grandfather         medical history unknown   • Other Other         6 siblings, medical history unknown   • Other Other         7 children, no known heart problems     Review of Systems   Constitutional: Positive for malaise/fatigue and weight loss (-3). Negative for decreased appetite.   HENT: Negative.     Eyes:  Negative for blurred vision.   Cardiovascular:  Positive for dyspnea on exertion and leg swelling. Negative for chest pain, palpitations and syncope.   Respiratory:  Negative for shortness of breath and sleep disturbances due to breathing.    Endocrine: Negative.    Hematologic/Lymphatic: Negative for bleeding problem. Does not bruise/bleed easily.   Skin: Negative.    Musculoskeletal:  Negative for falls and myalgias.   Gastrointestinal:  Negative for abdominal pain, heartburn and melena.   Genitourinary:  Negative for hematuria.   Neurological:  Negative for dizziness and light-headedness.   Psychiatric/Behavioral:  Positive for memory loss. Negative for altered mental status.    Allergic/Immunologic: Negative.       Objective    /58 (BP Location: Left arm)   Pulse 68   Ht 165 cm (64.96\")   Wt 58.8 kg (129 lb 9.6 oz)   BMI 21.59 kg/m²     Vitals and nursing note reviewed.   Constitutional:       General: Not in acute distress.     Appearance: Well-developed. Not diaphoretic.   Eyes:      Pupils: Pupils are equal, round, and reactive to light.   HENT:      Head: Normocephalic.   Pulmonary:      Effort: Pulmonary effort is normal. No respiratory distress.      Breath sounds: Normal breath sounds.   Cardiovascular:      Normal rate. Regular rhythm.      Murmurs: There is a grade 1/6 systolic murmur.   Pulses:     Intact distal pulses.   Edema:     " Peripheral edema present.     Ankle: bilateral trace edema of the ankle.  Abdominal:      General: Bowel sounds are normal.      Palpations: Abdomen is soft.   Musculoskeletal: Normal range of motion.      Cervical back: Normal range of motion. Skin:     General: Skin is warm and dry.   Neurological:      Mental Status: Alert and oriented to person, place, and time.        Procedures          Problem List Items Addressed This Visit          Cardiac and Vasculature    Heart murmur    Primary hypertension    Hypercholesterolemia       Symptoms and Signs    Syncope and collapse - Primary    Bilateral leg edema      HTN  -Well-controlled at 120/58  -Continue valsartan, amlodipine, minoxidil and Lasix  -Limit sodium intake    Hypercholesterolemia  -Managed with atorvastatin  -Lipids followed by PCP    PAF  -Less than 1% AF noted on Holter monitor  -She appears to be in rhythm today  -Continue Xarelto for CVA prophylaxis, she is not on antiarrhythmic or rate lowering drugs    Syncope  -No recurrent episodes  -No significant arrhythmia or carotid stenosis identified  -Encouraged adequate fluid intake to prevent hypovolemia, orthostatic hypotension  -Encouraged regular p.o. intake to maintain normal blood sugar    Bilateral LE edema  -Continue Lasix 20 mg, compression and elevation    No changes made today.  Follow-up 6 months or sooner if needed.    BMI is within normal parameters. No other follow-up for BMI required.               Electronically signed by TIANA Madrid,  November 9, 2023 11:58 EST

## 2023-11-07 NOTE — TELEPHONE ENCOUNTER
Attempted to call daughter back, unable to reach, and unable to leave message due to no voice mail set up.

## 2023-11-07 NOTE — LETTER
November 29, 2023    Rita Camacho  [address]  [place] [address]      Dear Rita,       We have been trying to get in contact with you and your daughter to discuss blood thinners but we have been unable to with the numbers on your patient profile. If you wouldn't mind, please give us a call back at [phone] if you still wish to discuss further.                 Ursula Mc, APRN

## 2023-11-07 NOTE — TELEPHONE ENCOUNTER
Patient's Daughter stopped back by after her appointment today and wanted to speak to someone about her medication.  Dr. Hankins wants her to stop taking her blood thinners (ASA and Xarelto) as he thinks this is making the patient become anemic and causing her mood changes and headaches.  Please call patient back to advise when you get a chance.  Thank you in advance.

## 2023-11-09 PROBLEM — R60.0 BILATERAL LEG EDEMA: Status: ACTIVE | Noted: 2023-11-09

## 2023-11-09 PROBLEM — E78.00 HYPERCHOLESTEROLEMIA: Status: ACTIVE | Noted: 2023-11-09

## 2023-11-09 NOTE — TELEPHONE ENCOUNTER
Attempted to contact daughter, unable to reach and unable to leave message due to no voice mail set up.

## 2023-11-13 NOTE — TELEPHONE ENCOUNTER
Attempted to contact daughter, unable to reach and unable to leave message due to no voicemail set up.

## 2023-11-17 NOTE — TELEPHONE ENCOUNTER
Attempted to contact patient and daughter, unable to reach and unable to leave message due to no voicemail set up.

## 2023-11-29 NOTE — TELEPHONE ENCOUNTER
Made letter requesting patient to call us and that we have been unable to reach with the numbers provided.

## 2023-12-11 ENCOUNTER — TELEPHONE (OUTPATIENT)
Dept: CARDIOLOGY | Facility: CLINIC | Age: 88
End: 2023-12-11
Payer: MEDICARE

## 2023-12-11 NOTE — TELEPHONE ENCOUNTER
Patient came in today after missing a call from the nurse earlier.  Please call back this afternoon. If she does not answer call right back so she knows that it is not a spam number calling

## 2023-12-11 NOTE — TELEPHONE ENCOUNTER
Agree with holding aspirin. Neg stress, normal carotids.     Holter showed few short runs of AF. She can hold Xarelto if needed, but would recommend not staying off for extended period of time.     Need to discuss that with PCP.    Hgb dropped one point from 6 months ago.

## 2023-12-11 NOTE — TELEPHONE ENCOUNTER
Spoke with daughter Aracely, she was concerned that PCP stopped aspirin and xarelto. Aracely had received call from PCP office to stop the medication due to anemia. Attempting to obtain labs from PCP.

## 2023-12-12 NOTE — TELEPHONE ENCOUNTER
PATIENTS DAUGHTER CALLED BACK FROM MISSED CALL FROM VINCENT MIXON.  PLEASE REACH BACK OUT DAUGHTER ELIER.

## 2023-12-12 NOTE — TELEPHONE ENCOUNTER
Attempted to contact patient and daughter Aracely, unable to reach and unable to leave message due to no voice mail set up.

## 2023-12-13 NOTE — TELEPHONE ENCOUNTER
Attempted to notify daughter of recommendations, unable to reach, and unable to leave message due to no voice mail.

## 2023-12-14 NOTE — TELEPHONE ENCOUNTER
Daughter Aracely made aware agree with holding aspirin. Can hold xarelto if needed, but would recommend not staying off for extended period of time. Aware need to discuss that with PCP. Aracely voiced understanding and to speak with PCP.

## 2024-05-06 ENCOUNTER — TELEPHONE (OUTPATIENT)
Dept: CARDIOLOGY | Facility: CLINIC | Age: 89
End: 2024-05-06
Payer: MEDICARE

## 2024-05-06 DIAGNOSIS — R09.89 BRUIT OF RIGHT CAROTID ARTERY: ICD-10-CM

## 2024-05-06 DIAGNOSIS — I25.6 SILENT MYOCARDIAL ISCHEMIA: ICD-10-CM

## 2024-05-08 RX ORDER — ASPIRIN 81 MG/1
81 TABLET ORAL DAILY
Qty: 30 TABLET | Refills: 6 | Status: SHIPPED | OUTPATIENT
Start: 2024-05-08

## 2024-05-20 ENCOUNTER — OFFICE VISIT (OUTPATIENT)
Dept: CARDIOLOGY | Facility: CLINIC | Age: 89
End: 2024-05-20
Payer: MEDICARE

## 2024-05-20 VITALS
SYSTOLIC BLOOD PRESSURE: 110 MMHG | DIASTOLIC BLOOD PRESSURE: 50 MMHG | HEIGHT: 65 IN | HEART RATE: 68 BPM | BODY MASS INDEX: 21.59 KG/M2

## 2024-05-20 DIAGNOSIS — E78.00 HYPERCHOLESTEROLEMIA: ICD-10-CM

## 2024-05-20 DIAGNOSIS — R55 SYNCOPE AND COLLAPSE: ICD-10-CM

## 2024-05-20 DIAGNOSIS — I10 PRIMARY HYPERTENSION: Primary | ICD-10-CM

## 2024-05-20 PROCEDURE — 1160F RVW MEDS BY RX/DR IN RCRD: CPT | Performed by: NURSE PRACTITIONER

## 2024-05-20 PROCEDURE — 1159F MED LIST DOCD IN RCRD: CPT | Performed by: NURSE PRACTITIONER

## 2024-05-20 PROCEDURE — 99214 OFFICE O/P EST MOD 30 MIN: CPT | Performed by: NURSE PRACTITIONER

## 2024-05-20 RX ORDER — AMLODIPINE BESYLATE AND ATORVASTATIN CALCIUM 5; 20 MG/1; MG/1
1 TABLET, FILM COATED ORAL DAILY
Qty: 90 TABLET | Refills: 3 | Status: SHIPPED | OUTPATIENT
Start: 2024-05-20

## 2024-05-20 RX ORDER — FUROSEMIDE 20 MG/1
20 TABLET ORAL DAILY
Qty: 90 TABLET | Refills: 1 | Status: SHIPPED | OUTPATIENT
Start: 2024-05-20

## 2024-05-20 RX ORDER — MEGESTROL ACETATE 40 MG/1
40 TABLET ORAL 2 TIMES DAILY
COMMUNITY

## 2024-05-20 RX ORDER — GABAPENTIN 100 MG/1
100 CAPSULE ORAL NIGHTLY
COMMUNITY

## 2024-05-20 RX ORDER — AMLODIPINE BESYLATE AND ATORVASTATIN CALCIUM 5; 20 MG/1; MG/1
1 TABLET, FILM COATED ORAL DAILY
Qty: 90 TABLET | Refills: 3 | Status: SHIPPED | OUTPATIENT
Start: 2024-05-20 | End: 2024-05-20 | Stop reason: SDUPTHER

## 2024-05-20 NOTE — PROGRESS NOTES
Chief Complaint   Patient presents with    Follow-up     Cardiac management    Lab     Last labs 1-2 weeks ago per PCP. Has been dx with anemia, has been referred to Dr Cordoba.     Headaches     Having constant headaches.     Med Refill     Will need refills on Lasix, Caduet, and Valsartan-90 day.     Subjective       Rita Camacho is an 89 y.o. female referred in July 2023 secondary to 2 syncopal episodes.  Both episodes occurred when she was awake, alert walking or talking.  She would slump over and be unconscious for a few seconds to minutes.  Work-up with stress, echo, Holter, carotid showed no cardiac ischemia, normal LV function, no significant valvular pathology, no carotid stenosis, no heart block or pauses.  Holter did show few runs of atrial fibrillation for which Xarelto was started.  She had elevated blood pressure at consult, minoxidil added to amlodipine and valsartan.  Later, minoxidil stopped.  Next     Labs 6/15/2023 showed BUN 33, creatinine 1.1, GFR 49, glucose 127, normal electrolytes, H/H 10.7/31.7, platelets 223, mag 2.0.  On 9/15/2023, GFR improved to 57.     She presents today for follow-up with her daughter who is her caregiver.  No recurrent syncope.  Patient denies chest pain or dyspnea.  According to her daughter, she complains of a headache daily.  BP monitored at home runs on the low side, 100-110 systolic.  She ambulates with a walker at home.  Today is in a wheelchair.  No s/s bleeding with Xarelto. Labs per PCP.        Cardiac History:    Past Surgical History:   Procedure Laterality Date    CARDIOVASCULAR STRESS TEST  08/17/2023    Lexiscan- EF 64%. 168/57. Negative    ECHO - CONVERTED  01/04/2022    @ [facility]. - EF 55%. LA- 4.0. RVSP- 44 mmHg    ECHO - CONVERTED  08/08/2018    @ [facility]. Dr. Kerr- EF 50%. RVSP- 49 mmHg.    ECHO - CONVERTED  08/15/2023    EF 60%. LA- 4.0. Mild MR & AI. RVSP- 54 mmHg    OTHER SURGICAL HISTORY  09/12/2023    MCOT. 25 Days. Avg 76. . <  1% A.Fib. 3 SVT. 3% PVC. 1% PAC    OTHER SURGICAL HISTORY  09/15/2023    CTA Carotid- No sig lesion    US CAROTID UNILATERAL  12/14/2021    < 50% Both ICA    US CAROTID UNILATERAL  08/15/2023    Modertae disease (L) ICA     Current Outpatient Medications   Medication Sig Dispense Refill    amLODIPine-atorvastatin (CADUET) 5-20 MG per tablet Take 1 tablet by mouth Daily. 90 tablet 3    aspirin 81 MG EC tablet Take 1 tablet by mouth Daily. 30 tablet 6    Calcium Carbonate (CALCIUM 500 PO) Take  by mouth Daily.      furosemide (LASIX) 20 MG tablet Take 1 tablet by mouth Daily. 90 tablet 1    gabapentin (NEURONTIN) 100 MG capsule Take 1 capsule by mouth Every Night.      megestrol (MEGACE) 40 MG tablet Take 1 tablet by mouth 2 (Two) Times a Day.      rivaroxaban (XARELTO) 20 MG tablet Take 1 tablet by mouth Daily. 90 tablet 3    valsartan (DIOVAN) 320 MG tablet Take 1 tablet by mouth Daily. Reduce to 1/2 tablet      vitamin D3 (Vitamin D) 125 MCG (5000 UT) capsule capsule Take 1 capsule by mouth Daily.       No current facility-administered medications for this visit.     Patient has no known allergies.    Past Medical History:   Diagnosis Date    Anemia     Arthritis     Dementia     History of hysterectomy     Hyperlipidemia     Hypertension     Stroke     daughter thinks pt may have had a stroke years ago     Social History     Socioeconomic History    Marital status:    Tobacco Use    Smoking status: Never     Passive exposure: Past    Smokeless tobacco: Never   Vaping Use    Vaping status: Never Used   Substance and Sexual Activity    Alcohol use: Never    Drug use: Never    Sexual activity: Defer     Family History   Problem Relation Age of Onset    Stroke Mother     Lung disease Father     Other Maternal Grandmother         medical history unknown    Other Maternal Grandfather         medical history unknown    Other Paternal Grandmother         medical history unknown    Other Paternal Grandfather          "medical history unknown    Other Other         6 siblings, medical history unknown    Other Other         7 children, no known heart problems     Review of Systems   Constitutional: Positive for malaise/fatigue. Negative for decreased appetite.   Eyes:  Negative for blurred vision.   Cardiovascular:  Negative for chest pain, dyspnea on exertion, leg swelling, palpitations and syncope.   Respiratory:  Negative for shortness of breath and sleep disturbances due to breathing.    Endocrine: Negative.    Hematologic/Lymphatic: Negative for bleeding problem. Does not bruise/bleed easily.   Skin: Negative.    Musculoskeletal:  Negative for falls and myalgias.   Gastrointestinal:  Negative for abdominal pain, heartburn and melena.   Genitourinary:  Negative for hematuria.   Neurological:  Positive for headaches. Negative for dizziness and light-headedness.   Psychiatric/Behavioral:  Positive for altered mental status and memory loss.    Allergic/Immunologic: Negative.       Objective     /50 (BP Location: Left arm, Patient Position: Sitting)   Pulse 68   Ht 165 cm (64.96\")   BMI 21.59 kg/m²     Vitals and nursing note reviewed.   Constitutional:       General: Not in acute distress.     Appearance: Well-developed. Not diaphoretic.   Eyes:      Pupils: Pupils are equal, round, and reactive to light.   HENT:      Head: Normocephalic.   Pulmonary:      Effort: Pulmonary effort is normal. No respiratory distress.      Breath sounds: Normal breath sounds.   Cardiovascular:      Normal rate. Regular rhythm.   Pulses:     Intact distal pulses.   Edema:     Peripheral edema absent.   Abdominal:      General: Bowel sounds are normal.      Palpations: Abdomen is soft.   Musculoskeletal: Normal range of motion.      Cervical back: Normal range of motion. Skin:     General: Skin is warm and dry.   Neurological:      Mental Status: Alert and oriented to person, place, and time.        Procedures          Problem List Items " Addressed This Visit          Cardiac and Vasculature    Primary hypertension - Primary    Relevant Medications    furosemide (LASIX) 20 MG tablet    Hypercholesterolemia       Symptoms and Signs    Syncope and collapse      Syncope  -Unclear etiology, cardiac workup negative  -No recurrent symptoms    HTN  -Mildly hypotensive  -Will try to reduce valsartan to half tablet, 160 mg daily continue amlodipine, low-dose Lasix    Hypercholesterolemia  -Labs 11/6/2023 did not include lipids, will try to obtain from PCP.  -Continue Caduet (atorvastatin 20 mg)    Mild anemia  -Continue Xarelto for PAF  -If GFR remains low, will change to renal dosing, 15 mg daily    PAF  -Rhythm regular, she remains in sinus  -Continue Xarelto, may need renal dosing based on most recent labs  -No rate lowering medications    Headache  -Will try to obtain labs  -Reduce antihypertensive  -Adequate fluid intake  -CTA carotids and vertebrals normal, 9/15/2023  -Advised to discuss further with PCP  -Significant dementia, patient unaware of surroundings, could not answer questions appropriately    Cardiac status stable.    BMI is within normal parameters. No other follow-up for BMI required.              Electronically signed by TIANA Madrid,  May 22, 2024 09:55 EDT

## 2024-05-20 NOTE — LETTER
May 22, 2024       No Recipients    Patient: Rita Camacho   YOB: 1935   Date of Visit: 5/20/2024       Dear TIANA Abraham    iRta Camacho was in my office today. Below is a copy of my note.    If you have questions, please do not hesitate to call me. I look forward to following Rita along with you.         Sincerely,        TIANA Madrid        CC:   No Recipients    Chief Complaint   Patient presents with   • Follow-up     Cardiac management   • Lab     Last labs 1-2 weeks ago per PCP. Has been dx with anemia, has been referred to Dr Cordoba.    • Headaches     Having constant headaches.    • Med Refill     Will need refills on Lasix, Caduet, and Valsartan-90 day.     Subjective      Rita Camacho is an 89 y.o. female referred in July 2023 secondary to 2 syncopal episodes.  Both episodes occurred when she was awake, alert walking or talking.  She would slump over and be unconscious for a few seconds to minutes.  Work-up with stress, echo, Holter, carotid showed no cardiac ischemia, normal LV function, no significant valvular pathology, no carotid stenosis, no heart block or pauses.  Holter did show few runs of atrial fibrillation for which Xarelto was started.  She had elevated blood pressure at consult, minoxidil added to amlodipine and valsartan.  Later, minoxidil stopped.  Next     Labs 6/15/2023 showed BUN 33, creatinine 1.1, GFR 49, glucose 127, normal electrolytes, H/H 10.7/31.7, platelets 223, mag 2.0.  On 9/15/2023, GFR improved to 57.     She presents today for follow-up with her daughter who is her caregiver.  No recurrent syncope.  Patient denies chest pain or dyspnea.  According to her daughter, she complains of a headache daily.  BP monitored at home runs on the low side, 100-110 systolic.  She ambulates with a walker at home.  Today is in a wheelchair.  No s/s bleeding with Xarelto. Labs per PCP.        Cardiac History:    Past Surgical History:   Procedure Laterality  Date   • CARDIOVASCULAR STRESS TEST  08/17/2023    Lexiscan- EF 64%. 168/57. Negative   • ECHO - CONVERTED  01/04/2022    @ [facility]. - EF 55%. LA- 4.0. RVSP- 44 mmHg   • ECHO - CONVERTED  08/08/2018    @ [facility]. Dr. Kerr- EF 50%. RVSP- 49 mmHg.   • ECHO - CONVERTED  08/15/2023    EF 60%. LA- 4.0. Mild MR & AI. RVSP- 54 mmHg   • OTHER SURGICAL HISTORY  09/12/2023    MCOT. 25 Days. Avg 76. . < 1% A.Fib. 3 SVT. 3% PVC. 1% PAC   • OTHER SURGICAL HISTORY  09/15/2023    CTA Carotid- No sig lesion   • US CAROTID UNILATERAL  12/14/2021    < 50% Both ICA   • US CAROTID UNILATERAL  08/15/2023    Modertae disease (L) ICA     Current Outpatient Medications   Medication Sig Dispense Refill   • amLODIPine-atorvastatin (CADUET) 5-20 MG per tablet Take 1 tablet by mouth Daily. 90 tablet 3   • aspirin 81 MG EC tablet Take 1 tablet by mouth Daily. 30 tablet 6   • Calcium Carbonate (CALCIUM 500 PO) Take  by mouth Daily.     • furosemide (LASIX) 20 MG tablet Take 1 tablet by mouth Daily. 90 tablet 1   • gabapentin (NEURONTIN) 100 MG capsule Take 1 capsule by mouth Every Night.     • megestrol (MEGACE) 40 MG tablet Take 1 tablet by mouth 2 (Two) Times a Day.     • rivaroxaban (XARELTO) 20 MG tablet Take 1 tablet by mouth Daily. 90 tablet 3   • valsartan (DIOVAN) 320 MG tablet Take 1 tablet by mouth Daily. Reduce to 1/2 tablet     • vitamin D3 (Vitamin D) 125 MCG (5000 UT) capsule capsule Take 1 capsule by mouth Daily.       No current facility-administered medications for this visit.     Patient has no known allergies.    Past Medical History:   Diagnosis Date   • Anemia    • Arthritis    • Dementia    • History of hysterectomy    • Hyperlipidemia    • Hypertension    • Stroke     daughter thinks pt may have had a stroke years ago     Social History     Socioeconomic History   • Marital status:    Tobacco Use   • Smoking status: Never     Passive exposure: Past   • Smokeless tobacco: Never   Vaping Use   • Vaping  "status: Never Used   Substance and Sexual Activity   • Alcohol use: Never   • Drug use: Never   • Sexual activity: Defer     Family History   Problem Relation Age of Onset   • Stroke Mother    • Lung disease Father    • Other Maternal Grandmother         medical history unknown   • Other Maternal Grandfather         medical history unknown   • Other Paternal Grandmother         medical history unknown   • Other Paternal Grandfather         medical history unknown   • Other Other         6 siblings, medical history unknown   • Other Other         7 children, no known heart problems     Review of Systems   Constitutional: Positive for malaise/fatigue. Negative for decreased appetite.   Eyes:  Negative for blurred vision.   Cardiovascular:  Negative for chest pain, dyspnea on exertion, leg swelling, palpitations and syncope.   Respiratory:  Negative for shortness of breath and sleep disturbances due to breathing.    Endocrine: Negative.    Hematologic/Lymphatic: Negative for bleeding problem. Does not bruise/bleed easily.   Skin: Negative.    Musculoskeletal:  Negative for falls and myalgias.   Gastrointestinal:  Negative for abdominal pain, heartburn and melena.   Genitourinary:  Negative for hematuria.   Neurological:  Positive for headaches. Negative for dizziness and light-headedness.   Psychiatric/Behavioral:  Positive for altered mental status and memory loss.    Allergic/Immunologic: Negative.       Objective    /50 (BP Location: Left arm, Patient Position: Sitting)   Pulse 68   Ht 165 cm (64.96\")   BMI 21.59 kg/m²     Vitals and nursing note reviewed.   Constitutional:       General: Not in acute distress.     Appearance: Well-developed. Not diaphoretic.   Eyes:      Pupils: Pupils are equal, round, and reactive to light.   HENT:      Head: Normocephalic.   Pulmonary:      Effort: Pulmonary effort is normal. No respiratory distress.      Breath sounds: Normal breath sounds.   Cardiovascular:      " Normal rate. Regular rhythm.   Pulses:     Intact distal pulses.   Edema:     Peripheral edema absent.   Abdominal:      General: Bowel sounds are normal.      Palpations: Abdomen is soft.   Musculoskeletal: Normal range of motion.      Cervical back: Normal range of motion. Skin:     General: Skin is warm and dry.   Neurological:      Mental Status: Alert and oriented to person, place, and time.        Procedures          Problem List Items Addressed This Visit          Cardiac and Vasculature    Primary hypertension - Primary    Relevant Medications    furosemide (LASIX) 20 MG tablet    Hypercholesterolemia       Symptoms and Signs    Syncope and collapse      Syncope  -Unclear etiology, cardiac workup negative  -No recurrent symptoms    HTN  -Mildly hypotensive  -Will try to reduce valsartan to half tablet, 160 mg daily continue amlodipine, low-dose Lasix    Hypercholesterolemia  -Labs 11/6/2023 did not include lipids, will try to obtain from PCP.  -Continue Caduet (atorvastatin 20 mg)    Mild anemia  -Continue Xarelto for PAF  -If GFR remains low, will change to renal dosing, 15 mg daily    PAF  -Rhythm regular, she remains in sinus  -Continue Xarelto, may need renal dosing based on most recent labs  -No rate lowering medications    Headache  -Will try to obtain labs  -Reduce antihypertensive  -Adequate fluid intake  -CTA carotids and vertebrals normal, 9/15/2023  -Advised to discuss further with PCP  -Significant dementia, patient unaware of surroundings, could not answer questions appropriately    Cardiac status stable.    BMI is within normal parameters. No other follow-up for BMI required.              Electronically signed by TIANA Madrid,  May 22, 2024 09:55 EDT

## 2024-05-21 ENCOUNTER — TELEPHONE (OUTPATIENT)
Dept: CARDIOLOGY | Facility: CLINIC | Age: 89
End: 2024-05-21

## 2024-05-21 NOTE — TELEPHONE ENCOUNTER
Caller: ESELIER    Relationship: Emergency Contact    Best call back number: [phone]    What is the best time to reach you: ANYTIME    Who are you requesting to speak with (clinical staff, provider,  specific staff member): CLINICAL    Do you know the name of the person who called: N/A    What was the call regarding: PATIENT DAUGHTER CALLED IN WITH INFORMATION FOR [employer] INSURANCE, FOR PATIENT TO BE ABLE TO GET COUPONS FOR ENSURE PLUS FIBER DRINKS.   [employer]: [phone]. TO CALL AND SEE IF INSURANCE WILL COVER THE  ENSURE PLUS FIBER. DRINK FOR THE PATIENT.  TIANA SULLIVAN WILL NEED TO GO ON LINE AND GET PAPER WORK .    Is it okay if the provider responds through MyChart:

## 2024-05-23 ENCOUNTER — TELEPHONE (OUTPATIENT)
Dept: CARDIOLOGY | Facility: CLINIC | Age: 89
End: 2024-05-23
Payer: MEDICARE

## 2024-05-24 NOTE — TELEPHONE ENCOUNTER
Patient daughter called back and was made aware that we could not fill out the paperwork for ensure and that it would have to come from patient's family doctor. Patient also made aware that we have attempted to get patient's labs but have been unable to receive a copy with the results on it. Have attempted to get results again and patient offered to get a physical copy of the labs if unsuccessful again on Tuesday.

## 2024-07-30 ENCOUNTER — TRANSCRIBE ORDERS (OUTPATIENT)
Dept: ADMINISTRATIVE | Facility: HOSPITAL | Age: 89
End: 2024-07-30
Payer: MEDICARE

## 2024-07-30 DIAGNOSIS — N18.32 CHRONIC KIDNEY DISEASE (CKD) STAGE G3B/A1, MODERATELY DECREASED GLOMERULAR FILTRATION RATE (GFR) BETWEEN 30-44 ML/MIN/1.73 SQUARE METER AND ALBUMINURIA CREATININE RATIO LESS THAN 30 MG/G (CMS/H*: Primary | ICD-10-CM

## 2024-08-19 ENCOUNTER — TELEPHONE (OUTPATIENT)
Dept: CARDIOLOGY | Facility: CLINIC | Age: 89
End: 2024-08-19
Payer: MEDICARE

## 2024-08-19 NOTE — TELEPHONE ENCOUNTER
Reduce Xarelto to renal dose, 15 mg    Stop aspirin     See lab update (she is anemic, would not let me do a result note)

## 2024-08-19 NOTE — TELEPHONE ENCOUNTER
Daughter Aracely made aware of recommendations to reduce xarelto to renal dose 15 mg once daily and stop aspirin, understanding voiced.

## 2024-08-21 ENCOUNTER — TELEPHONE (OUTPATIENT)
Dept: CARDIOLOGY | Facility: CLINIC | Age: 89
End: 2024-08-21
Payer: MEDICARE

## 2024-08-21 NOTE — TELEPHONE ENCOUNTER
Patient daughter requested samples on behalf of patient. Patient daughter would also like refill to go to [facility].

## 2024-09-11 ENCOUNTER — HOSPITAL ENCOUNTER (OUTPATIENT)
Dept: ULTRASOUND IMAGING | Facility: HOSPITAL | Age: 89
Discharge: HOME | End: 2024-09-11
Admitting: INTERNAL MEDICINE
Payer: MEDICARE

## 2024-09-11 DIAGNOSIS — N18.32 CHRONIC KIDNEY DISEASE (CKD) STAGE G3B/A1, MODERATELY DECREASED GLOMERULAR FILTRATION RATE (GFR) BETWEEN 30-44 ML/MIN/1.73 SQUARE METER AND ALBUMINURIA CREATININE RATIO LESS THAN 30 MG/G (CMS/H*: ICD-10-CM

## 2024-09-11 PROCEDURE — 76775 US EXAM ABDO BACK WALL LIM: CPT

## 2024-11-20 ENCOUNTER — TELEPHONE (OUTPATIENT)
Dept: CARDIOLOGY | Facility: CLINIC | Age: 89
End: 2024-11-20
Payer: MEDICARE

## 2024-11-20 NOTE — TELEPHONE ENCOUNTER
"I called and spoke at length with Aracely. She said pt has been in the hospital with fluid on the brain, dementia much worse, now with hospice. She is concerned that pt seems to \"just go out, then when I slap her face and take my fist and rub her heart she seems to wake up, I was just wondering if Dr Rashid thought it was something to do with her heart\".  Advised her that with fluid on the brain she that could definitely be a complication but if hospice is following then the main objective is to keep pt comfortable. She agreed but did say she had contacted pt's PCP to ask them about it also.   "

## 2024-11-20 NOTE — TELEPHONE ENCOUNTER
Caller: ELIER SUGGS    Relationship: Emergency Contact    Best call back number: [phone]    What is the best time to reach you: ANY    What was the call regarding: ADVISING THE PATIENT HAS FLUID ON THE BRAIN MAKING THE DEMENTIA WORSE. SHE IS UNDER HOSPICE CARE. SHE NEEDS TO SPEAK WITH DR. STEWARD. PLEASE CALL THE ABOVE ASAP.     Is it okay if the provider responds through MyChart: NO

## 2024-11-21 NOTE — TELEPHONE ENCOUNTER
It is secondary to the hydrocephalus.  Not a cardiac cause.  One of the major cause for dementia.  Only supportive care

## 2025-07-15 RX ORDER — RIVAROXABAN 15 MG/1
TABLET, FILM COATED ORAL
Qty: 30 TABLET | Refills: 11 | Status: SHIPPED | OUTPATIENT
Start: 2025-07-15